# Patient Record
Sex: MALE | Race: WHITE
[De-identification: names, ages, dates, MRNs, and addresses within clinical notes are randomized per-mention and may not be internally consistent; named-entity substitution may affect disease eponyms.]

---

## 2020-08-18 ENCOUNTER — NURSE TRIAGE (OUTPATIENT)
Dept: OTHER | Facility: CLINIC | Age: 25
End: 2020-08-18

## 2020-08-19 NOTE — TELEPHONE ENCOUNTER
Reason for Disposition   Jammed finger    Protocols used: FINGER INJURY-ADULT-AH    Caller states her  got his pinky finger wrapped in an auger. Finger is swollen with deep purple bruising at base of finger. Finger blanches at finger nail. Pain is 7/10 if he moves the finger. Denies deformity. Recommending home care at this time, gently tape to ring finger for support, ice, Ibuprofen or Tylenol. Call back if symptoms worsen or have additional questions. This triage is a result of a call to Ophelia lopez Nurse. Please do not respond to the triage nurse through this encounter. Any subsequent communication should be directly with the patient.